# Patient Record
Sex: FEMALE | Race: WHITE | NOT HISPANIC OR LATINO | URBAN - METROPOLITAN AREA
[De-identification: names, ages, dates, MRNs, and addresses within clinical notes are randomized per-mention and may not be internally consistent; named-entity substitution may affect disease eponyms.]

---

## 2022-01-20 ENCOUNTER — ESTABLISHED PATIENT (OUTPATIENT)
Dept: URBAN - METROPOLITAN AREA CLINIC 21 | Facility: CLINIC | Age: 77
End: 2022-01-20

## 2022-01-20 DIAGNOSIS — H40.1131: ICD-10-CM

## 2022-01-20 PROCEDURE — 99214 OFFICE O/P EST MOD 30 MIN: CPT

## 2022-01-20 PROCEDURE — 92083 EXTENDED VISUAL FIELD XM: CPT

## 2022-01-20 ASSESSMENT — KERATOMETRY
OD_K1POWER_DIOPTERS: 43.50
OS_K1POWER_DIOPTERS: 43.50
OD_AXISANGLE_DEGREES: 0
OS_AXISANGLE2_DEGREES: 54
OS_AXISANGLE_DEGREES: 144
OD_K2POWER_DIOPTERS: 43.50
OD_AXISANGLE2_DEGREES: 90
OS_K2POWER_DIOPTERS: 43.75

## 2022-01-20 ASSESSMENT — TONOMETRY
OD_IOP_MMHG: 34
OS_IOP_MMHG: 28
OD_IOP_MMHG: 31
OS_IOP_MMHG: 26

## 2022-01-20 ASSESSMENT — VISUAL ACUITY
OD_SC: 20/25-1
OD_CC: 20/20
OS_CC: 20/20
OU_CC: J1+
OS_SC: 20/20

## 2022-01-20 NOTE — PATIENT DISCUSSION
pt normally managed by Dr James Schmidt, f/u for IOP/VF today. VF normal/stable OU. IOP mod/markedly elev OD>OD, pt not using any drops per instructions from Dr James Schmidt. Given high IOP advised pt to restart drops (pt is unsure of name but has a teal cap) and f/u w/ Dr James Schmidt next week as sched.

## 2022-01-25 ENCOUNTER — FOLLOW UP (OUTPATIENT)
Dept: URBAN - METROPOLITAN AREA CLINIC 21 | Facility: CLINIC | Age: 77
End: 2022-01-25

## 2022-01-25 DIAGNOSIS — H02.886: ICD-10-CM

## 2022-01-25 DIAGNOSIS — H02.883: ICD-10-CM

## 2022-01-25 DIAGNOSIS — H35.373: ICD-10-CM

## 2022-01-25 DIAGNOSIS — H40.1131: ICD-10-CM

## 2022-01-25 DIAGNOSIS — H43.813: ICD-10-CM

## 2022-01-25 PROCEDURE — 92014 COMPRE OPH EXAM EST PT 1/>: CPT

## 2022-01-25 RX ORDER — DORZOLAMIDE HYDROCHLORIDE TIMOLOL MALEATE 20; 5 MG/ML; MG/ML: 1 SOLUTION/ DROPS OPHTHALMIC TWICE A DAY

## 2022-01-25 ASSESSMENT — KERATOMETRY
OS_AXISANGLE2_DEGREES: 54
OS_AXISANGLE_DEGREES: 144
OS_K2POWER_DIOPTERS: 43.75
OD_AXISANGLE2_DEGREES: 90
OS_K1POWER_DIOPTERS: 43.50
OD_K1POWER_DIOPTERS: 43.50
OD_K2POWER_DIOPTERS: 43.50
OD_AXISANGLE_DEGREES: 0

## 2022-01-25 ASSESSMENT — TONOMETRY
OS_IOP_MMHG: 25
OD_IOP_MMHG: 23

## 2022-01-25 ASSESSMENT — VISUAL ACUITY
OS_SC: 20/25
OD_SC: 20/20

## 2022-01-25 NOTE — PATIENT DISCUSSION
Patient recently started on new oral medications and found to have elevated IOP above goal, restarted Latanoprost with improvement of IOP but still above goal today. Therefore adding to therapy.

## 2022-02-22 ENCOUNTER — FOLLOW UP (OUTPATIENT)
Dept: URBAN - METROPOLITAN AREA CLINIC 21 | Facility: CLINIC | Age: 77
End: 2022-02-22

## 2022-02-22 DIAGNOSIS — H40.1131: ICD-10-CM

## 2022-02-22 PROCEDURE — 92012 INTRM OPH EXAM EST PATIENT: CPT

## 2022-02-22 ASSESSMENT — TONOMETRY
OD_IOP_MMHG: 17
OS_IOP_MMHG: 15

## 2022-02-22 ASSESSMENT — KERATOMETRY
OD_AXISANGLE_DEGREES: 0
OS_K2POWER_DIOPTERS: 43.75
OD_K1POWER_DIOPTERS: 43.50
OS_AXISANGLE_DEGREES: 144
OS_AXISANGLE2_DEGREES: 54
OS_K1POWER_DIOPTERS: 43.50
OD_K2POWER_DIOPTERS: 43.50
OD_AXISANGLE2_DEGREES: 90

## 2022-02-22 ASSESSMENT — VISUAL ACUITY
OS_SC: 20/25+1
OD_SC: 20/20-2

## 2022-02-22 NOTE — PATIENT DISCUSSION
Patient recently started on new oral medications and found to have elevated IOP above goal, restarted Latanoprost with improvement of IOP but still above goal today. Therefore added Dorz-Cresencio.

## 2022-08-23 ENCOUNTER — FOLLOW UP (OUTPATIENT)
Dept: URBAN - METROPOLITAN AREA CLINIC 21 | Facility: CLINIC | Age: 77
End: 2022-08-23

## 2022-08-23 DIAGNOSIS — H40.1131: ICD-10-CM

## 2022-08-23 PROCEDURE — 99214 OFFICE O/P EST MOD 30 MIN: CPT

## 2022-08-23 PROCEDURE — 92133 CPTRZD OPH DX IMG PST SGM ON: CPT

## 2022-08-23 ASSESSMENT — TONOMETRY
OS_IOP_MMHG: 14
OD_IOP_MMHG: 16

## 2022-08-23 ASSESSMENT — VISUAL ACUITY
OS_SC: 20/20-1
OD_SC: 20/25+2

## 2023-04-11 ENCOUNTER — FOLLOW UP (OUTPATIENT)
Dept: URBAN - METROPOLITAN AREA CLINIC 21 | Facility: CLINIC | Age: 78
End: 2023-04-11

## 2023-04-11 DIAGNOSIS — H40.1131: ICD-10-CM

## 2023-04-11 PROCEDURE — 92012 INTRM OPH EXAM EST PATIENT: CPT

## 2023-04-11 ASSESSMENT — VISUAL ACUITY
OS_SC: 20/25+2
OD_SC: 20/40
OU_SC: 20/20-2

## 2023-04-11 ASSESSMENT — TONOMETRY
OS_IOP_MMHG: 14
OD_IOP_MMHG: 19

## 2023-10-17 ENCOUNTER — FOLLOW UP (OUTPATIENT)
Dept: URBAN - METROPOLITAN AREA CLINIC 21 | Facility: CLINIC | Age: 78
End: 2023-10-17

## 2023-10-17 DIAGNOSIS — H40.1131: ICD-10-CM

## 2023-10-17 PROCEDURE — 99214 OFFICE O/P EST MOD 30 MIN: CPT

## 2023-10-17 PROCEDURE — 92133 CPTRZD OPH DX IMG PST SGM ON: CPT

## 2023-10-17 ASSESSMENT — VISUAL ACUITY
OD_SC: 20/40
OS_SC: 20/30+2

## 2023-10-17 ASSESSMENT — TONOMETRY
OS_IOP_MMHG: 17
OD_IOP_MMHG: 18

## 2024-05-01 ENCOUNTER — TECH ONLY (OUTPATIENT)
Dept: URBAN - METROPOLITAN AREA CLINIC 21 | Facility: CLINIC | Age: 79
End: 2024-05-01

## 2024-05-01 DIAGNOSIS — H40.1131: ICD-10-CM

## 2024-05-01 PROCEDURE — 99211T TECH SERVICE

## 2024-05-01 PROCEDURE — 92083 EXTENDED VISUAL FIELD XM: CPT

## 2024-05-14 ENCOUNTER — ESTABLISHED PATIENT (OUTPATIENT)
Dept: URBAN - METROPOLITAN AREA CLINIC 21 | Facility: CLINIC | Age: 79
End: 2024-05-14

## 2024-05-14 DIAGNOSIS — H35.373: ICD-10-CM

## 2024-05-14 DIAGNOSIS — H40.1131: ICD-10-CM

## 2024-05-14 DIAGNOSIS — H02.885: ICD-10-CM

## 2024-05-14 DIAGNOSIS — H02.882: ICD-10-CM

## 2024-05-14 DIAGNOSIS — H43.813: ICD-10-CM

## 2024-05-14 PROCEDURE — 92014 COMPRE OPH EXAM EST PT 1/>: CPT

## 2024-05-14 ASSESSMENT — TONOMETRY
OD_IOP_MMHG: 17
OS_IOP_MMHG: 19

## 2024-05-14 ASSESSMENT — VISUAL ACUITY
OD_SC: 20/20
OD_SC: J1
OS_SC: J5
OS_SC: 20/25

## 2024-11-19 ENCOUNTER — FOLLOW UP (OUTPATIENT)
Dept: URBAN - METROPOLITAN AREA CLINIC 21 | Facility: CLINIC | Age: 79
End: 2024-11-19

## 2024-11-19 DIAGNOSIS — H02.885: ICD-10-CM

## 2024-11-19 DIAGNOSIS — H02.882: ICD-10-CM

## 2024-11-19 DIAGNOSIS — H35.3131: ICD-10-CM

## 2024-11-19 DIAGNOSIS — H40.1131: ICD-10-CM

## 2024-11-19 DIAGNOSIS — H35.373: ICD-10-CM

## 2024-11-19 DIAGNOSIS — H43.813: ICD-10-CM

## 2024-11-19 PROCEDURE — 92014 COMPRE OPH EXAM EST PT 1/>: CPT

## 2024-11-19 PROCEDURE — 92134 CPTRZ OPH DX IMG PST SGM RTA: CPT
